# Patient Record
Sex: MALE | Race: AMERICAN INDIAN OR ALASKA NATIVE
[De-identification: names, ages, dates, MRNs, and addresses within clinical notes are randomized per-mention and may not be internally consistent; named-entity substitution may affect disease eponyms.]

---

## 2022-08-14 ENCOUNTER — HOSPITAL ENCOUNTER (EMERGENCY)
Dept: HOSPITAL 5 - ED | Age: 62
LOS: 1 days | Discharge: LEFT BEFORE DISCHARGE | End: 2022-08-15
Payer: MEDICARE

## 2022-08-14 VITALS — SYSTOLIC BLOOD PRESSURE: 121 MMHG | DIASTOLIC BLOOD PRESSURE: 91 MMHG

## 2022-08-14 DIAGNOSIS — Z53.21: ICD-10-CM

## 2022-08-14 DIAGNOSIS — R07.9: Primary | ICD-10-CM

## 2022-08-14 LAB
ALBUMIN SERPL-MCNC: 4.5 G/DL (ref 3.9–5)
ALT SERPL-CCNC: 103 UNITS/L (ref 7–56)
BASOPHILS # (AUTO): 0.1 K/MM3 (ref 0–0.1)
BASOPHILS NFR BLD AUTO: 1.3 % (ref 0–1.8)
BUN SERPL-MCNC: 13 MG/DL (ref 9–20)
BUN/CREAT SERPL: 14 %
CALCIUM SERPL-MCNC: 9.4 MG/DL (ref 8.4–10.2)
EOSINOPHIL # BLD AUTO: 0.1 K/MM3 (ref 0–0.4)
EOSINOPHIL NFR BLD AUTO: 2.6 % (ref 0–4.3)
HCT VFR BLD CALC: 42.5 % (ref 35.5–45.6)
HEMOLYSIS INDEX: 4
HGB BLD-MCNC: 14.7 GM/DL (ref 11.8–15.2)
LYMPHOCYTES # BLD AUTO: 2.2 K/MM3 (ref 1.2–5.4)
LYMPHOCYTES NFR BLD AUTO: 40.3 % (ref 13.4–35)
MCHC RBC AUTO-ENTMCNC: 35 % (ref 32–34)
MCV RBC AUTO: 103 FL (ref 84–94)
MONOCYTES # (AUTO): 0.6 K/MM3 (ref 0–0.8)
MONOCYTES % (AUTO): 10.7 % (ref 0–7.3)
PLATELET # BLD: 195 K/MM3 (ref 140–440)
RBC # BLD AUTO: 4.12 M/MM3 (ref 3.65–5.03)

## 2022-08-14 PROCEDURE — 85025 COMPLETE CBC W/AUTO DIFF WBC: CPT

## 2022-08-14 PROCEDURE — 71046 X-RAY EXAM CHEST 2 VIEWS: CPT

## 2022-08-14 PROCEDURE — 36415 COLL VENOUS BLD VENIPUNCTURE: CPT

## 2022-08-14 PROCEDURE — 93005 ELECTROCARDIOGRAM TRACING: CPT

## 2022-08-14 PROCEDURE — 84484 ASSAY OF TROPONIN QUANT: CPT

## 2022-08-14 PROCEDURE — 80053 COMPREHEN METABOLIC PANEL: CPT

## 2022-08-14 NOTE — ELECTROCARDIOGRAPH REPORT
[facility]

                                       

Test Date:    2022               Test Time:    09:32:36

Pat Name:     FAUZIA KEITH           Department:   

Patient ID:   [id number]          Room:          

Gender:       M                        Technician:   BEVERLY TRAVIS RN

:          1960               Requested By: ED DOC

Order Number: [id number]             Reading MD:   Tiffanie Brasher

                                 Measurements

Intervals                              Axis          

Rate:         96                       P:            72

DC:           160                      QRS:          33

QRSD:         78                       T:            34

QT:           360                                    

QTc:          454                                    

                           Interpretive Statements

Sinus rhythm

Probable left atrial enlargement

No previous ECG available for comparison

Electronically Signed On 2022 14:44:53 EDT by Tiffanie Brasher

## 2022-08-14 NOTE — XRAY REPORT
CHEST 2 VIEWS 



INDICATION / CLINICAL INFORMATION: chest pain.



COMPARISON: 4/6/2021



FINDINGS:



SUPPORT DEVICES: None.

HEART / MEDIASTINUM: No significant abnormality. 

LUNGS / PLEURA: No significant pulmonary or pleural abnormality. No pneumothorax. 



ADDITIONAL FINDINGS: No significant additional findings.



IMPRESSION:

1. No acute findings.



Signer Name: Kade Rocha DO 

Signed: 8/14/2022 9:55 AM

Workstation Name: [id number]

## 2022-08-23 ENCOUNTER — P2P PATIENT RECORD (OUTPATIENT)
Age: 62
End: 2022-08-23

## 2022-08-23 ENCOUNTER — HOSPITAL ENCOUNTER (EMERGENCY)
Dept: HOSPITAL 5 - ED | Age: 62
Discharge: HOME | End: 2022-08-23
Payer: MEDICARE

## 2022-08-23 VITALS — SYSTOLIC BLOOD PRESSURE: 139 MMHG | DIASTOLIC BLOOD PRESSURE: 90 MMHG

## 2022-08-23 DIAGNOSIS — F17.200: ICD-10-CM

## 2022-08-23 DIAGNOSIS — K44.9: Primary | ICD-10-CM

## 2022-08-23 DIAGNOSIS — I10: ICD-10-CM

## 2022-08-23 DIAGNOSIS — Z72.89: ICD-10-CM

## 2022-08-23 DIAGNOSIS — M19.90: ICD-10-CM

## 2022-08-23 PROCEDURE — 93005 ELECTROCARDIOGRAM TRACING: CPT

## 2022-08-23 PROCEDURE — 99282 EMERGENCY DEPT VISIT SF MDM: CPT

## 2022-08-23 NOTE — EMERGENCY DEPARTMENT REPORT
ED Abdominal Pain HPI





- General


Stated Complaint: CHEST PAIN


PUI?: No


Time Seen by Provider: 08/23/22 09:40


Source: patient


Mode of arrival: Ambulatory


Limitations: No Limitations





- History of Present Illness


Initial Comments: 





60 yo comes to ER with known hiatal hernia. He is requesting GI referral. 





He has had a recent; less than 30 day normal stress test and echo.. 





HIs pain is epigastric and worse with certain foods that he eats; and with etoh.







no cp


no sob


no fever


no chills


no n/v/d


no bloody stools 





VS are normal 





- Related Data


                                    Allergies











Allergy/AdvReac Type Severity Reaction Status Date / Time


 


No Known Allergies Allergy   Unverified 04/06/21 22:53














ED Review of Systems


ROS: 


Stated complaint: CHEST PAIN


Other details as noted in HPI





Comment: All other systems reviewed and negative





ED Past Medical Hx





- Past Medical History


Previous Medical History?: Yes


Hx Hypertension: Yes


Hx Arthritis: Yes


Additional medical history: hiatal hernia, sciatic nerve pain





- Surgical History


Past Surgical History?: Yes


Additional Surgical History: Cataract OU





- Family History


Family history: no significant





- Social History


Smoking Status: Current Every Day Smoker


Substance Use Type: Alcohol





ED Physical Exam





- General


Limitations: No Limitations


General appearance: alert, in no apparent distress





- Head


Head exam: Present: atraumatic, normocephalic





- Eye


Eye exam: Present: normal appearance





- ENT


ENT exam: Present: mucous membranes moist





- Neck


Neck exam: Present: normal inspection





- Respiratory


Respiratory exam: Present: normal lung sounds bilaterally.  Absent: respiratory 

distress





- Cardiovascular


Cardiovascular Exam: Present: regular rate, normal rhythm.  Absent: systolic 

murmur, diastolic murmur, rubs, gallop





- GI/Abdominal


GI/Abdominal exam: Present: soft, normal bowel sounds





- Rectal


Rectal exam: Present: deferred





- Extremities Exam


Extremities exam: Present: normal inspection





- Back Exam


Back exam: Present: normal inspection





- Neurological Exam


Neurological exam: Present: alert, oriented X3





- Psychiatric


Psychiatric exam: Present: normal affect, normal mood





- Skin


Skin exam: Present: warm, dry, intact, normal color.  Absent: rash





ED Course


                                   Vital Signs











  08/23/22





  09:46


 


Temperature 98.3 F


 


Pulse Rate 96 H


 


Respiratory 18





Rate 


 


Blood Pressure 139/90





[Right] 


 


O2 Sat by Pulse 98





Oximetry 














- Reevaluation(s)


Reevaluation #1: 





pt on PPI daily at home


advised to take bid and or add pepcid over the counter








ED Medical Decision Making





- EKG Data


-: EKG Interpreted by Me


EKG shows normal: sinus rhythm


Rate: normal





- EKG Data


When compared to previous EKG there are: no significant change


Interpretation: no acute changes





- Medical Decision Making








                                   Vital Signs











  08/23/22





  09:46


 


Temperature 98.3 F


 


Pulse Rate 96 H


 


Respiratory 18





Rate 


 


Blood Pressure 139/90





[Right] 


 


O2 Sat by Pulse 98





Oximetry 








ekg x 2 nap 





recent normal echo and stress test





pt educated on diet management of his epigastric concerns. He knows he needs 

endoscopy and is asking for GI referral 





Pt given referral, he has called from ER and has an appnt.  He is to call each 

am for cancellation to move appnt up. 








Pt dc home with dc plan of care including diet, meds, activity and follow up. He

verbalizes understanding of plan of care. 


Pt ambulatory, in NAD and taking po without difficulty 


Critical care attestation.: 


If time is entered above; I have spent that time in minutes in the direct care 

of this critically ill patient, excluding procedure time.








ED Disposition


Clinical Impression: 


 Hiatal hernia





Disposition: 01 HOME / SELF CARE / HOMELESS


Is pt being admited?: No


Does the pt Need Aspirin: No


Condition: Stable


Instructions:  Gastroesophageal Reflux Disease, Adult, Easy-to-Read


Referrals: 


JOSE C RAYA MD [Staff Physician] - 3-5 Days


FLOERNCIO HERNANDEZ MD [Staff Physician] - 3-5 Days


Forms:  Work/School Release Form(ED)


Time of Disposition: 09:42

## 2022-08-25 NOTE — ELECTROCARDIOGRAPH REPORT
[facility]

                                       

Test Date:    2022               Test Time:    09:30:40

Pat Name:     FAUZIA KEITH           Department:   

Patient ID:   [id number]          Room:          

Gender:       M                        Technician:   TERRY

:          1960               Requested By: RONNIE CAUSEY

Order Number: [id number]             Reading MD:   Chapincito Hagan

                                 Measurements

Intervals                              Axis          

Rate:         96                       P:            61

NV:           159                      QRS:          51

QRSD:         86                       T:            87

QT:           352                                    

QTc:          446                                    

                           Interpretive Statements

Sinus rhythm

nonspecific st-t

Compared to ECG 2022 09:32:36

Electronically Signed On 2022 9:41:25 EDT by Chapincito Hagan

## 2022-10-05 ENCOUNTER — LAB OUTSIDE AN ENCOUNTER (OUTPATIENT)
Dept: URBAN - METROPOLITAN AREA CLINIC 109 | Facility: CLINIC | Age: 62
End: 2022-10-05

## 2022-10-05 ENCOUNTER — OFFICE VISIT (OUTPATIENT)
Dept: URBAN - METROPOLITAN AREA CLINIC 109 | Facility: CLINIC | Age: 62
End: 2022-10-05
Payer: MEDICARE

## 2022-10-05 VITALS
BODY MASS INDEX: 31.23 KG/M2 | WEIGHT: 235.6 LBS | TEMPERATURE: 97.5 F | DIASTOLIC BLOOD PRESSURE: 96 MMHG | HEIGHT: 73 IN | HEART RATE: 87 BPM | SYSTOLIC BLOOD PRESSURE: 155 MMHG

## 2022-10-05 DIAGNOSIS — K21.9 CHRONIC GERD: ICD-10-CM

## 2022-10-05 DIAGNOSIS — R10.13 EPIGASTRIC PAIN: ICD-10-CM

## 2022-10-05 DIAGNOSIS — K43.9 VENTRAL HERNIA WITHOUT OBSTRUCTION OR GANGRENE: ICD-10-CM

## 2022-10-05 DIAGNOSIS — Z12.11 SCREENING FOR MALIGNANT NEOPLASM OF COLON: ICD-10-CM

## 2022-10-05 PROBLEM — 305058001: Status: ACTIVE | Noted: 2022-10-05

## 2022-10-05 PROCEDURE — 99204 OFFICE O/P NEW MOD 45 MIN: CPT | Performed by: INTERNAL MEDICINE

## 2022-10-05 PROCEDURE — 99244 OFF/OP CNSLTJ NEW/EST MOD 40: CPT | Performed by: INTERNAL MEDICINE

## 2022-10-05 RX ORDER — AMLODIPINE BESYLATE 5 MG/1
1 TABLET TABLET ORAL ONCE A DAY
Status: ACTIVE | COMMUNITY

## 2022-10-05 RX ORDER — OMEPRAZOLE 40 MG/1
1 CAPSULE 30 MINUTES BEFORE MORNING MEAL CAPSULE, DELAYED RELEASE ORAL ONCE A DAY
Status: ACTIVE | COMMUNITY

## 2022-10-05 RX ORDER — BISACODYL 5 MG
3 TABLET, DELAYED RELEASE (ENTERIC COATED) ORAL
Qty: 3 | Refills: 0 | OUTPATIENT
Start: 2022-10-05 | End: 2022-10-06

## 2022-10-05 RX ORDER — SIMVASTATIN 40 MG
1 TABLET IN THE EVENING TABLET ORAL ONCE A DAY
Status: ACTIVE | COMMUNITY

## 2022-10-05 RX ORDER — GABAPENTIN 300 MG/1
1 CAPSULE CAPSULE ORAL ONCE A DAY
Status: ACTIVE | COMMUNITY

## 2022-10-05 RX ORDER — POLYETHYLENE GLYCOL 3350, SODIUM SULFATE ANHYDROUS, SODIUM BICARBONATE, SODIUM CHLORIDE, POTASSIUM CHLORIDE 236; 22.74; 6.74; 5.86; 2.97 G/4L; G/4L; G/4L; G/4L; G/4L
AS DIRECTED POWDER, FOR SOLUTION ORAL
Qty: 1 | Refills: 0 | OUTPATIENT
Start: 2022-10-05 | End: 2022-10-06

## 2022-10-05 RX ORDER — METFORMIN HYDROCHLORIDE 500 MG/1
1 TABLET WITH A MEAL TABLET, FILM COATED ORAL ONCE A DAY
Status: ACTIVE | COMMUNITY

## 2022-10-05 RX ORDER — MELOXICAM 15 MG/1
1 TABLET TABLET ORAL ONCE A DAY
Status: ACTIVE | COMMUNITY

## 2022-10-05 NOTE — HPI-TODAY'S VISIT:
The patient presents as referral from Dr Sanju Oliveira for evaluation of abd pain/gerd and colonoscopy.  A copy will be sent to the referring provider.  Patient has had epigastric pain and reflux sx's for ~1 year. reports episodes occur ~2 times per day, lasts for ~15 minutes, not associated with meals. takes anti-acid medication but continues to have breakthrough sx's.  on nsaid's for sciatica related pain.  seeing surgeon for ventral hernia repair.  denies lower gi complaints/symptoms, last colonoscopy over 10 years ago.  denies h/o cad/chf, cva, etc.  not on plavix/AC.

## 2022-10-06 PROBLEM — 275978004 SCREENING FOR MALIGNANT NEOPLASM OF COLON: Status: ACTIVE | Noted: 2022-10-05

## 2022-10-06 PROBLEM — 79922009: Status: ACTIVE | Noted: 2022-10-05

## 2022-10-10 ENCOUNTER — TELEPHONE ENCOUNTER (OUTPATIENT)
Dept: URBAN - METROPOLITAN AREA CLINIC 92 | Facility: CLINIC | Age: 62
End: 2022-10-10

## 2022-10-10 ENCOUNTER — CLAIMS CREATED FROM THE CLAIM WINDOW (OUTPATIENT)
Dept: URBAN - METROPOLITAN AREA CLINIC 4 | Facility: CLINIC | Age: 62
End: 2022-10-10
Payer: MEDICARE

## 2022-10-10 ENCOUNTER — OFFICE VISIT (OUTPATIENT)
Dept: URBAN - METROPOLITAN AREA SURGERY CENTER 23 | Facility: SURGERY CENTER | Age: 62
End: 2022-10-10
Payer: MEDICARE

## 2022-10-10 DIAGNOSIS — D12.5 ADENOMA OF SIGMOID COLON: ICD-10-CM

## 2022-10-10 DIAGNOSIS — K31.89 OTHER DISEASES OF STOMACH AND DUODENUM: ICD-10-CM

## 2022-10-10 DIAGNOSIS — K31.89 ACQUIRED DEFORMITY OF DUODENUM: ICD-10-CM

## 2022-10-10 DIAGNOSIS — D12.3 ADENOMA OF TRANSVERSE COLON: ICD-10-CM

## 2022-10-10 DIAGNOSIS — K63.5 BENIGN COLON POLYP: ICD-10-CM

## 2022-10-10 DIAGNOSIS — Z12.11 COLON CANCER SCREENING: ICD-10-CM

## 2022-10-10 DIAGNOSIS — R10.13 ABDOMINAL DISCOMFORT, EPIGASTRIC: ICD-10-CM

## 2022-10-10 PROCEDURE — 43239 EGD BIOPSY SINGLE/MULTIPLE: CPT | Performed by: INTERNAL MEDICINE

## 2022-10-10 PROCEDURE — G8907 PT DOC NO EVENTS ON DISCHARG: HCPCS | Performed by: INTERNAL MEDICINE

## 2022-10-10 PROCEDURE — 45380 COLONOSCOPY AND BIOPSY: CPT | Performed by: INTERNAL MEDICINE

## 2022-10-10 PROCEDURE — 88312 SPECIAL STAINS GROUP 1: CPT | Performed by: PATHOLOGY

## 2022-10-10 PROCEDURE — 45385 COLONOSCOPY W/LESION REMOVAL: CPT | Performed by: INTERNAL MEDICINE

## 2022-10-10 PROCEDURE — 88305 TISSUE EXAM BY PATHOLOGIST: CPT | Performed by: PATHOLOGY

## 2022-10-10 RX ORDER — PANTOPRAZOLE SODIUM 40 MG/1
1 TABLET TABLET, DELAYED RELEASE ORAL ONCE A DAY
Qty: 30 | Refills: 2 | OUTPATIENT

## 2022-10-10 RX ORDER — AMLODIPINE BESYLATE 5 MG/1
1 TABLET TABLET ORAL ONCE A DAY
Status: ACTIVE | COMMUNITY

## 2022-10-10 RX ORDER — MELOXICAM 15 MG/1
1 TABLET TABLET ORAL ONCE A DAY
Status: ACTIVE | COMMUNITY

## 2022-10-10 RX ORDER — OMEPRAZOLE 40 MG/1
1 CAPSULE 30 MINUTES BEFORE MORNING MEAL CAPSULE, DELAYED RELEASE ORAL ONCE A DAY
Status: ACTIVE | COMMUNITY

## 2022-10-10 RX ORDER — SIMVASTATIN 40 MG
1 TABLET IN THE EVENING TABLET ORAL ONCE A DAY
Status: ACTIVE | COMMUNITY

## 2022-10-10 RX ORDER — GABAPENTIN 300 MG/1
1 CAPSULE CAPSULE ORAL ONCE A DAY
Status: ACTIVE | COMMUNITY

## 2022-10-10 RX ORDER — METFORMIN HYDROCHLORIDE 500 MG/1
1 TABLET WITH A MEAL TABLET, FILM COATED ORAL ONCE A DAY
Status: ACTIVE | COMMUNITY

## 2022-10-26 ENCOUNTER — OFFICE VISIT (OUTPATIENT)
Dept: URBAN - METROPOLITAN AREA CLINIC 109 | Facility: CLINIC | Age: 62
End: 2022-10-26
Payer: MEDICARE

## 2022-10-26 ENCOUNTER — OFFICE VISIT (OUTPATIENT)
Dept: URBAN - METROPOLITAN AREA CLINIC 109 | Facility: CLINIC | Age: 62
End: 2022-10-26

## 2022-10-26 VITALS
SYSTOLIC BLOOD PRESSURE: 131 MMHG | HEART RATE: 122 BPM | WEIGHT: 241.4 LBS | HEIGHT: 73 IN | DIASTOLIC BLOOD PRESSURE: 77 MMHG | TEMPERATURE: 97.7 F | BODY MASS INDEX: 31.99 KG/M2

## 2022-10-26 DIAGNOSIS — K21.9 CHRONIC GERD: ICD-10-CM

## 2022-10-26 DIAGNOSIS — R10.13 DYSPEPSIA: ICD-10-CM

## 2022-10-26 DIAGNOSIS — K29.60 OTHER GASTRITIS WITHOUT HEMORRHAGE, UNSPECIFIED CHRONICITY: ICD-10-CM

## 2022-10-26 DIAGNOSIS — Z86.010 PERSONAL HISTORY OF COLONIC POLYPS: ICD-10-CM

## 2022-10-26 PROBLEM — 4556007: Status: ACTIVE | Noted: 2022-10-26

## 2022-10-26 PROBLEM — 235595009: Status: ACTIVE | Noted: 2022-10-05

## 2022-10-26 PROBLEM — 428283002: Status: ACTIVE | Noted: 2022-10-26

## 2022-10-26 PROCEDURE — 99214 OFFICE O/P EST MOD 30 MIN: CPT | Performed by: INTERNAL MEDICINE

## 2022-10-26 RX ORDER — PANTOPRAZOLE SODIUM 40 MG/1
1 TABLET TABLET, DELAYED RELEASE ORAL ONCE A DAY
Qty: 30 | Refills: 2 | Status: ACTIVE | COMMUNITY

## 2022-10-26 RX ORDER — MELOXICAM 15 MG/1
1 TABLET TABLET ORAL ONCE A DAY
Status: ACTIVE | COMMUNITY

## 2022-10-26 RX ORDER — OMEPRAZOLE 40 MG/1
1 CAPSULE 30 MINUTES BEFORE MORNING MEAL CAPSULE, DELAYED RELEASE ORAL ONCE A DAY
Status: ACTIVE | COMMUNITY

## 2022-10-26 RX ORDER — AMLODIPINE BESYLATE 5 MG/1
1 TABLET TABLET ORAL ONCE A DAY
Status: ACTIVE | COMMUNITY

## 2022-10-26 RX ORDER — SIMVASTATIN 40 MG
1 TABLET IN THE EVENING TABLET ORAL ONCE A DAY
Status: ACTIVE | COMMUNITY

## 2022-10-26 RX ORDER — METFORMIN HYDROCHLORIDE 500 MG/1
1 TABLET WITH A MEAL TABLET, FILM COATED ORAL ONCE A DAY
Status: ACTIVE | COMMUNITY

## 2022-10-26 RX ORDER — GABAPENTIN 300 MG/1
1 CAPSULE CAPSULE ORAL ONCE A DAY
Status: ACTIVE | COMMUNITY

## 2022-10-26 RX ORDER — PANTOPRAZOLE SODIUM 40 MG/1
1 TABLET TABLET, DELAYED RELEASE ORAL ONCE A DAY
Qty: 30 TABLET | Refills: 3 | OUTPATIENT
Start: 2022-10-26

## 2022-10-26 NOTE — HPI-TODAY'S VISIT:
The patient presents for f/u appt.  reports ppi has helped with reflux sx's.  weight stable.  awaiting path from egd/colonoscopy.  no new gi complaints/symptoms.

## 2023-02-15 ENCOUNTER — OFFICE VISIT (OUTPATIENT)
Dept: URBAN - METROPOLITAN AREA CLINIC 109 | Facility: CLINIC | Age: 63
End: 2023-02-15

## 2023-02-15 ENCOUNTER — DASHBOARD ENCOUNTERS (OUTPATIENT)
Age: 63
End: 2023-02-15

## 2023-02-15 VITALS — HEIGHT: 73 IN

## 2023-02-15 RX ORDER — AMLODIPINE BESYLATE 5 MG/1
1 TABLET TABLET ORAL ONCE A DAY
COMMUNITY

## 2023-02-15 RX ORDER — GABAPENTIN 300 MG/1
1 CAPSULE CAPSULE ORAL ONCE A DAY
COMMUNITY

## 2023-02-15 RX ORDER — MELOXICAM 15 MG/1
1 TABLET TABLET ORAL ONCE A DAY
COMMUNITY

## 2023-02-15 RX ORDER — PANTOPRAZOLE SODIUM 40 MG/1
1 TABLET TABLET, DELAYED RELEASE ORAL ONCE A DAY
Qty: 30 TABLET | Refills: 3 | COMMUNITY
Start: 2022-10-26

## 2023-02-15 RX ORDER — OMEPRAZOLE 40 MG/1
1 CAPSULE 30 MINUTES BEFORE MORNING MEAL CAPSULE, DELAYED RELEASE ORAL ONCE A DAY
COMMUNITY

## 2023-02-15 RX ORDER — SIMVASTATIN 40 MG
1 TABLET IN THE EVENING TABLET ORAL ONCE A DAY
COMMUNITY

## 2023-02-15 RX ORDER — PANTOPRAZOLE SODIUM 40 MG/1
1 TABLET TABLET, DELAYED RELEASE ORAL ONCE A DAY
Qty: 30 | Refills: 2 | COMMUNITY

## 2023-02-15 RX ORDER — METFORMIN HYDROCHLORIDE 500 MG/1
1 TABLET WITH A MEAL TABLET, FILM COATED ORAL ONCE A DAY
COMMUNITY